# Patient Record
(demographics unavailable — no encounter records)

---

## 2024-10-16 NOTE — ASSESSMENT
[FreeTextEntry1] : Ms. Diez is a 88 year old female, with HTN, HLD, GERD, ?IBS, TIAs, solitary functioning kidney here for management of CKD  CKD: Likely in the setting of severe vascular disease as well as solitary kidney. She has stenosis of the right renal artery with renal atrophy. Serum creatinine recently has been ranging between 1.5-1.7 since 8/2022,. Recently noted to have a serum creatinine of 2.  Unclear if this was in the setting of decreased hydration  Repeat labs today with serum creatinine back down to 1.5 mg/dl which is her baseline K recently has been in the normal range. She can liberalize her diet  Acidosis persists: she admits to forgetting her PO bicarb TID and only takes BID. Will increase to 2 tabs BID.  U/A with no protein/blood. +WBC present but likely contaminated as epithelial cells present Avoid nephrotoxins. No NSAIDS  HTN: BP at goal Maintain current regimen.  Anemia: Hg slightly low but at goal for CKD DEE DEE only when Hg is less than 10  Mild Secondary Hyperparathyroidism: Vit D replete PTH trending down, Monitor  F/U In 4 months

## 2024-10-16 NOTE — REVIEW OF SYSTEMS
[Feeling Tired] : feeling tired [As Noted in HPI] : as noted in HPI [Joint Stiffness] : joint stiffness [Negative] : Heme/Lymph [Feeling Poorly] : feeling poorly [Recent Weight Gain (___ Lbs)] : no recent weight gain [Recent Weight Loss (___ Lbs)] : no recent weight loss [Abdominal Pain] : no abdominal pain [Vomiting] : no vomiting [Constipation] : no constipation [Diarrhea] : no diarrhea [Heartburn] : no heartburn [Pelvic Pain] : no pelvic pain [Dysmenorrhea] : no dysmenorrhea [Joint Swelling] : no joint swelling [FreeTextEntry7] :  IBS improved [FreeTextEntry8] : +NOCTURIA [FreeTextEntry9] : low back pain

## 2024-10-16 NOTE — HISTORY OF PRESENT ILLNESS
[FreeTextEntry1] : Ms. Diez is a  88 year old female, with HTN, HLD, GERD, ?IBS, TIAs, solitary functioning kidney here for management of CKD  Pt. was first made aware of having an atrophic kidney ~Dec 2019 when she was diagnosed with diverticulits . She is not aware of elevated Scr and mentions that she was advised to see a nephrologist due to solitary functioning kidney.  CT angiogram in 2020 revealed severe   diffuse atherosclerotic arterial calcification. 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 Severe stenosis at the origin of the superior mesenteric artery. Patent inferior mesenteric artery. 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  Fusiform dilatation of the ascending thoracic aorta, measuring 3.4 cm. She underwent graft placement of the right common iliac artery as well as the right superior mesenteric artery for mesenteric arterial stenosis. She  states that she has a poor appetite and has lost 90 lbs weight over past 3 years, and lost ~10 lbs in the last 6 months.    10/16: Melanoma resected 2 weeks ago It was an out patient procedure. Caught early ., LN negative Scheduled for PET ( pending insurance coverage)  Has Left back pain in the morning BP at home has been 130-140 range recent BUN/ creatinine : 35/2 mg/dl which is higher than her baseline

## 2024-10-16 NOTE — PHYSICAL EXAM
[General Appearance - Alert] : alert [General Appearance - In No Acute Distress] : in no acute distress [General Appearance - Well Nourished] : well nourished [Sclera] : the sclera and conjunctiva were normal [Examination Of The Oral Cavity] : the lips and gums were normal [Oropharynx] : the oropharynx was normal [Jugular Venous Distention Increased] : there was no jugular-venous distention [Exaggerated Use Of Accessory Muscles For Inspiration] : no accessory muscle use [Auscultation Breath Sounds / Voice Sounds] : lungs were clear to auscultation bilaterally [Heart Sounds] : normal S1 and S2 [Murmurs] : no murmurs [Heart Sounds Pericardial Friction Rub] : no pericardial rub [Edema] : there was no peripheral edema [Abdomen Soft] : soft [No CVA Tenderness] : no ~M costovertebral angle tenderness [Abnormal Walk] : normal gait [Involuntary Movements] : no involuntary movements were seen [] : no rash [No Focal Deficits] : no focal deficits [Affect] : the affect was normal [Oriented To Time, Place, And Person] : oriented to person, place, and time [Mood] : the mood was normal [FreeTextEntry1] : +pallor

## 2025-02-07 NOTE — PHYSICAL EXAM
[Normal] : Deep tendon reflexes were 2+ and symmetric, the sensory exam was normal to light touch and pinprick and no focal deficits [de-identified] : pain with lumbar flexion and extension [Straight-Leg Raise Test - Left] : straight leg raise of the left leg was negative [Straight-Leg Raise Test - Right] : straight leg raise  of the right leg was negative [] : positive on the right [Able to toe walk] : the patient was able to toe walk [Able to heel walk] : the patient was able to heel walk

## 2025-02-07 NOTE — ASSESSMENT
[FreeTextEntry1] : 88 year old female with low back pain and lumbar spinal stenosis.  Given the nature of the patient's complaints and lack of significant improvement following more conservative measures, we did discuss lumbar epidural steroid injection.  Risks, benefits, and expectations of the procedure were reviewed including but not limited to bleeding, infection, and nerve damage.  The patient was provided with an educational pamphlet outlining the details of the procedure so that he/she may have the ability to review the information prior to proceeding.  The patient is on Plavix so we have asked her to get a clearnace from her neurologist, Dr. Pruitt, to stop the medication for 7 days prior to the procedure.  She will follow up with me thereafter to schedule.

## 2025-02-07 NOTE — HISTORY OF PRESENT ILLNESS
[Back] : back [___ yrs] : [unfilled] year(s) ago [10] : a maximum pain level of 10/10 [Sharp] : sharp [Dull] : dull [Aching] : aching [Standing] : standing [Walking] : walking [Laying] : laying [Sitting] : sitting [Gait Dysfunction] : gait dysfunction [PT] : PT [Medications] : medications [FreeTextEntry5] : history of bladder incontinence [FreeTextEntry6] : Gabapentin, Tramadol, Lidocaine patches, Tylenol

## 2025-02-07 NOTE — DATA REVIEWED
[MRI] : MRI [FreeTextEntry1] : EXAM: [id number] - MR SPINE LUMBAR - ORDERED BY: RADHA WOOD   PROCEDURE DATE: 07/17/2024    INTERPRETATION: CLINICAL INFORMATION: Back pain  ADDITIONAL CLINICAL INFORMATION: Spondylolisthesis M43.16  TECHNIQUE: Multiplanar, multisequence MRI was performed of the lumbar spine. IV Contrast: NONE  PRIOR STUDIES: No priors available for comparison.  FINDINGS:  LOCALIZER: No additional findings. BONES: There is no fracture or bone marrow edema. ALIGNMENT: There is moderate levocurvature of the lumbar spine. Grade 1 anterolisthesis at L4-L5. SACROILIAC JOINTS/SACRUM: There is no sacral fracture. The SI joints are partially visualized but are intact. CONUS AND CAUDA EQUINA: The distal cord and conus are normal in signal. Conus terminates at L1. VISUALIZED INTRAPELVIC/INTRA-ABDOMINAL SOFT TISSUES: Moderate right renal cortical atrophy. PARASPINAL SOFT TISSUES: Normal.   INDIVIDUAL LEVELS:  LOWER THORACIC SPINE: No spinal canal or neuroforaminal stenosis.  L1-L2: Broad-based posterior disc bulge and mild bilateral facet arthropathy result in mild right neural foraminal narrowing but no significant left neural foraminal narrowing or central canal narrowing. L2-L3: Broad-based posterior disc bulge and moderate bilateral facet arthropathy result in mild bilateral neural foraminal narrowing but no significant central canal narrowing. L3-L4: Broad-based posterior disc bulge with superimposed right intraforaminal protrusion in conjunction with moderate bilateral facet arthropathy results in mild to moderate right neural foraminal narrowing but no significant left neural foraminal narrowing or central canal narrowing. L4-L5: Grade 1 anterolisthesis. Broad-based posterior disc bulge and moderate bilateral facet arthropathy results in mild to moderate central canal narrowing but no significant right or left neural foraminal narrowing. There is bilateral lateral recess stenosis. L5-S1: Broad-based posterior disc bulge and moderate bilateral facet arthropathy result in mild bilateral neural foraminal narrowing but no significant central canal narrowing.   IMPRESSION:  Multilevel discogenic degenerative disease and facet arthropathy of the lumbar spine, most pronounced at L4-L5 where there is mild to moderate central canal narrowing. Additional varying degrees of central canal and neural foraminal narrowing, as above.  --- End of Report ---       MIKI VASQUEZ MBA-EDGARDO FOSTER; Attending Radiologist This document has been electronically signed. Jul 24 2024 10:37AM

## 2025-02-21 NOTE — PHYSICAL EXAM
[General Appearance - Alert] : alert [General Appearance - In No Acute Distress] : in no acute distress [Sclera] : the sclera and conjunctiva were normal [Examination Of The Oral Cavity] : the lips and gums were normal [Oropharynx] : the oropharynx was normal [Jugular Venous Distention Increased] : there was no jugular-venous distention [Exaggerated Use Of Accessory Muscles For Inspiration] : no accessory muscle use [Auscultation Breath Sounds / Voice Sounds] : lungs were clear to auscultation bilaterally [Heart Sounds] : normal S1 and S2 [Murmurs] : no murmurs [Heart Sounds Pericardial Friction Rub] : no pericardial rub [Abdomen Soft] : soft [No CVA Tenderness] : no ~M costovertebral angle tenderness [Abnormal Walk] : normal gait [Involuntary Movements] : no involuntary movements were seen [] : no rash [No Focal Deficits] : no focal deficits [Oriented To Time, Place, And Person] : oriented to person, place, and time [Affect] : the affect was normal [Mood] : the mood was normal [FreeTextEntry1] : trace edema

## 2025-02-21 NOTE — ASSESSMENT
[FreeTextEntry1] : Ms. Diez is a 88 year old female, with HTN, HLD, GERD, ?IBS, TIAs, solitary functioning kidney here for management of CKD  CKD: Likely in the setting of severe vascular disease as well as solitary kidney. She has stenosis of the right renal artery with renal atrophy. Serum creatinine recently has been ranging between 1.5-1.7 since 8/2022,.  Repeat labs today noted. Creatinine at baseline K back up again. Low K diet Acidosis persists. Maintain on PO bicarb. Asked if she can increase to 3 tabs bid U/A with no protein/blood.   Avoid nephrotoxins. No NSAIDS  HTN: BP elevated, also with trace edema Admits to eating high salt food Reinforced low-salt diet.  Also asked her to buy a home BP monitor and keep a recording for the next couple of weeks and email me the final results Maintain current regimen.  Anemia: Hg stable Currently with no need for DEE DEE  Mild Secondary Hyperparathyroidism: Vit D replete PTH overall stable.   F/U In 4 months  Left detailed vm for daughter Niecy

## 2025-02-21 NOTE — REVIEW OF SYSTEMS
[Feeling Poorly] : feeling poorly [Feeling Tired] : feeling tired [As Noted in HPI] : as noted in HPI [Joint Stiffness] : joint stiffness [Negative] : Heme/Lymph [SOB on Exertion] : shortness of breath during exertion [Recent Weight Gain (___ Lbs)] : no recent weight gain [Recent Weight Loss (___ Lbs)] : no recent weight loss [Abdominal Pain] : no abdominal pain [Vomiting] : no vomiting [Constipation] : no constipation [Diarrhea] : no diarrhea [Heartburn] : no heartburn [Pelvic Pain] : no pelvic pain [Dysmenorrhea] : no dysmenorrhea [Joint Swelling] : no joint swelling [FreeTextEntry7] :  IBS improved [FreeTextEntry8] : +NOCTURIA [FreeTextEntry9] : low back pain

## 2025-02-21 NOTE — HISTORY OF PRESENT ILLNESS
[FreeTextEntry1] : Ms. Diez is a  88 year old female, with HTN, HLD, GERD, ?IBS, TIAs, solitary functioning kidney here for management of CKD  Pt. was first made aware of having an atrophic kidney ~Dec 2019 when she was diagnosed with diverticulits . She is not aware of elevated Scr and mentions that she was advised to see a nephrologist due to solitary functioning kidney.  CT angiogram in 2020 revealed severe   diffuse atherosclerotic arterial calcification. 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 Severe stenosis at the origin of the superior mesenteric artery. Patent inferior mesenteric artery. 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  Fusiform dilatation of the ascending thoracic aorta, measuring 3.4 cm. She underwent graft placement of the right common iliac artery as well as the right superior mesenteric artery for mesenteric arterial stenosis. She  states that she has a poor appetite and has lost 90 lbs weight over past 3 years, and lost ~10 lbs in the last 6 months.   10/16: Melanoma resected 2 weeks ago It was an out patient procedure. Caught early ., LN negative Scheduled for PET ( pending insurance coverage)  Has Left back pain in the morning BP at home has been 130-140 range recent BUN/ creatinine : 35/2 mg/dl which is higher than her baseline  Feb 2025: She comes for a follow-up visit +Nodule lung: Plan is to monitor  Thyroid sonogram  /PET scan  done: results pending  Needs epidural for her back pain so currently her Plavix is on hold, Has not been measuring BP at home .  Admits to eating a high salt diet since the Super Bowl

## 2025-02-27 NOTE — PROCEDURE
[de-identified] : [facility] [address], [place], [address] - [phone]  PATIENT: FARSHAD ROSE  MEDICAL RECORD #:  DATE OF OPERATION: 02/27/2025  PREOPERATIVE DIAGNOSIS:  LUMBAR RADICULAR PAIN POSTOPERATIVE DIAGNOSIS:  LUMBAR RADICULAR PAIN PROCEDURE: LUMBAR EPIDURAL STEROID INJECTION UNDER X-RAY GUIDANCE SURGEON:  JEAN CLAUDE CA M.D. ANESTHEISA:  LOCAL EBL:  MINIMAL  INDICATIONS:  The patient returns to Pain Management with persistent lower back pain with radiation down both legs.  The pain has remained quite significant and interferes with the patients ability to perform ADLs despite the implementation of other conservative measures.  I discussed with the patient at length the risks, benefits, and expectations of the aforementioned procedure.  All of the patients questions were answered.  The patient signed informed consent and agreed to proceed.  PROCEDURE:  The patient was transported to the operating room, placed in the prone position and monitored non-invasively with stable vital signs and no complaints.  The patients back was prepped three times with betadine and draped in meticulous sterile fashion.  The L4-L5 interspace was identified fluoroscopically and the skin overlying this level was infiltrated with 5cc of 1% preservative-free lidocaine using a 25 gauge one inch needle.  The epidural space was approached and entered at this level with a 20 gauge Tuohy needle by loss of resistance technique.  Following loss of resistance to air, aspiration was negative for CSF or heme.  Depo-Medrol 80mg was then injected with 1cc of preservative-free 1% lidocaine.  The needle tract was flushed with 2cc of 1% lidocaine and the needle was removed.  A sterile bandage was applied.  The patient tolerated the procedure well without complaint or complication.  The patient was observed in stable condition after the procedure for approximately 30 minutes before being discharged to home in the company of an adult.  The patient was provided with full written instructions.  The patient will be seen for follow-up in my office in 1 week but certainly sooner with any questions or concerns.    Jean Claude Ca M.D.

## 2025-03-19 NOTE — HISTORY OF PRESENT ILLNESS
[FreeTextEntry1] : Patient returns after the first LESI a few weeks ago.  The patient reports 10% improvement in the symptoms.  The patient returns for a follow up visit today.

## 2025-03-19 NOTE — ASSESSMENT
[FreeTextEntry1] : 88 year old female with low back and lumbar radicular pain now minimally improved following her first LESI.  She is agreeable to a repeat injection.  Risks, benefits, and expectations of the procedure were reviewed including but not limited to bleeding, infection, and nerve damage.  The patient was provided with an educational pamphlet outlining the details of the procedure so that he/she may have the ability to review the information prior to proceeding.  The patient has agreed to proceed and will follow up with me for the procedure.

## 2025-03-21 NOTE — ADDENDUM
[FreeTextEntry1] :  Documented by Timmy Ronquillo acting as scribe for Dr. Trammell on 03/21/2025. All Medical record entries made by the Scribe were at my, Dr. Trammell, direction and personally dictated by me on 03/21/2025 . I have reviewed the chart and agree that the record accurately reflects my personal performance of the history, physical exam, assessment and plan. I have also personally directed, reviewed, and agreed with the discharge instructions.

## 2025-03-21 NOTE — ASSESSMENT
[FreeTextEntry1] :  Reviewed and reconciled medications, allergies, PMHx, PSHx, SocHx, FMHx.  Pt. with h/o dizziness, bilateral SNHL - wears hearing aids, tinnitus, dizziness, reflux, dysphagia, vocal cord paresis, cough, chronic rhinitis, and constantly spitting up phlegm presents today stating that she gets a lot of phlegm in her throat and she feels like she cannot breathe. She states that she gets a PND. She states that she used Atrovent nasal spray in the past with significant improvement. She states that her nose runs frequently. She states that she is taking blood thinners. She states that she has had a COVID vaccine.  Physical exam: -NOSE score 65 -TNSS 8 -right ear canal: cerumen impaction removed via curettage -deviated septum -inflamed turbinates -nasal valve collapse -positive risa maneuver -clear nasal discharge  ENT Procedure: Flexible Nasal Endoscopy with a Look at the Larynx -deviated septum left -no polyps or growths bilaterally -enlarged middle turbinate bilaterally -mild edema postcricoid  Plan: -Continue Atrovent - 1 or 2 sprays bilaterally up to QID, spray laterally -Discussed bilateral Rhinaer procedure for posterior nasal nerve ablation of the inferior and middle turbinate. R/b/a were discussed. Risks include but are not limited to bleeding, infection, crusting, scarring, persistence of symptoms, need for future procedure. All questions were answered. -FU after procedure

## 2025-03-21 NOTE — PHYSICAL EXAM
[] : septum deviated bilaterally [Normal] : mucosa is normal [Midline] : trachea located in midline position [Hearing Loss Right Only] : normal [Hearing Loss Left Only] : normal [FreeTextEntry8] : cerumen impaction removed via curettage [de-identified] : nasal valve collapse. positive risa maneuver [de-identified] : inflamed turbinates [de-identified] : clear nasal discharge

## 2025-03-21 NOTE — HISTORY OF PRESENT ILLNESS
[de-identified] : Pt. with h/o dizziness, bilateral SNHL - wears hearing aids, tinnitus, dizziness, reflux, dysphagia, vocal cord paresis, cough, chronic rhinitis, and constantly spitting up phlegm presents today stating that she gets a lot of phlegm in her throat and she feels like she cannot breathe. She states that she gets a PND. She states that she used Atrovent nasal spray in the past with significant improvement. She states that her nose runs frequently. She states that she is taking blood thinners. She states that she has had a COVID vaccine.
Discharged

## 2025-03-21 NOTE — CONSULT LETTER
[Dear  ___] : Dear  [unfilled], [Courtesy Letter:] : I had the pleasure of seeing your patient, [unfilled], in my office today. [Please see my note below.] : Please see my note below. [Consult Closing:] : Thank you very much for allowing me to participate in the care of this patient.  If you have any questions, please do not hesitate to contact me. [Sincerely,] : Sincerely, [FreeTextEntry3] :  Bon Trammell MD FACS

## 2025-03-21 NOTE — PROCEDURE
[Recalcitrant Symptoms] : recalcitrant symptoms  [Flexible Endoscope] : examined with the flexible endoscope [de-identified] : Dr. Trammell [de-identified] : Chronic rhinitis, Cough [FreeTextEntry6] :  Procedure: Flexible Nasal Endoscopy with a Look at the Larynx: Risks, benefits, and alternatives of flexible endoscopy were explained to the patient. The patient gave oral consent to proceed. The flexible scope was inserted into the right nasal cavity. Endoscopy of the inferior and middle meatus was performed. No polyp, mass, or lesion was appreciated. Olfactory cleft was clear. Spheno-ethmoid recess clear. Nasopharynx was clear. Turbinates were without mass, but enlarged middle turbinate bilaterally. There was a deviated septum left. Oropharyngeal walls were symmetric and mobile without lesion, mass, or edema. Hypopharynx was also without lesion or edema. Larynx was mobile without lesions. Supraglottic structures were free of mass and asymmetry, but mild edema postcricoid. True vocal folds were white without mass or lesion. Base of tongue was within normal limits. -deviated septum left -no polyps or growths bilaterally -enlarged middle turbinate bilaterally -mild edema postcricoid

## 2025-04-01 NOTE — HISTORY OF PRESENT ILLNESS
[de-identified] : Pt. with h/o dizziness, bilateral SNHL - wears hearing aids, tinnitus, reflux, dysphagia, vocal cord paresis, cough, chronic rhinitis, GERD, and laryngeal edema presents today for an in-office procedure: bilateral Rhinaer procedure for posterior nasal nerve ablation of the inferior and middle turbinate.

## 2025-04-01 NOTE — HISTORY OF PRESENT ILLNESS
[de-identified] : Pt. with h/o dizziness, bilateral SNHL - wears hearing aids, tinnitus, reflux, dysphagia, vocal cord paresis, cough, chronic rhinitis, GERD, and laryngeal edema presents today for an in-office procedure: bilateral Rhinaer procedure for posterior nasal nerve ablation of the inferior and middle turbinate.

## 2025-04-01 NOTE — ADDENDUM
[FreeTextEntry1] :  Documented by Timmy Ronquillo acting as scribe for Dr. Trammell on 04/01/2025. All Medical record entries made by the Scribe were at my, Dr. Trammell, direction and personally dictated by me on 04/01/2025 . I have reviewed the chart and agree that the record accurately reflects my personal performance of the history, physical exam, assessment and plan. I have also personally directed, reviewed, and agreed with the discharge instructions.

## 2025-04-01 NOTE — ASSESSMENT
[FreeTextEntry1] :  Reviewed and reconciled medications, allergies, PMHx, PSHx, SocHx, FMHx.  Pt. with h/o dizziness, bilateral SNHL - wears hearing aids, tinnitus, reflux, dysphagia, vocal cord paresis, cough, chronic rhinitis, GERD, and laryngeal edema presents today for an in-office procedure: bilateral Rhinaer procedure for posterior nasal nerve ablation of the inferior and middle turbinate.  ENT Procedure: bilateral Rhinaer procedure for posterior nasal nerve ablation of the inferior and middle turbinate  Plan: -Start saline gel spray (AYR No Drip) - avoid swallowing -Post-op instructions provided -FU in 3 weeks

## 2025-04-01 NOTE — PROCEDURE
[FreeTextEntry1] : bilateral Rhinaer procedure for posterior nasal nerve ablation of the inferior and middle turbinate [FreeTextEntry2] : Chronic rhinitis [FreeTextEntry3] : After informed consent was obtained, pledgets impregnated with 2% topical lidocaine and oxymetazoline were applied to bilateral nasal cavities. Then a total of 7cc of 2% lidocaine was injected bilaterally. Once sufficiently anesthetized, the temperature-controlled radiofrequency stylus was introduced to ablate the turbinates with low energy, temperature controlled bilaterally. Right middle turbinate hit with stylus 8 times, each time for 12 seconds. Left middle turbinate hit with stylus 8 times, each time for 12 seconds. Then the right inferior turbinate was hit with the low energy, temperature-controlled stylus 3 times, the left inferior turbinate hit with the stylus 4 times. The patient tolerated the procedure without difficulty, and no complications were encountered. Post procedure instructions provided.

## 2025-04-15 NOTE — HISTORY OF PRESENT ILLNESS
[TextBox_4] : 88 y.o female PMH Asthma, HTN, GERD, Allergic rhinitis, HLD, Diverticulitis, Mesenteric ischemia IBS here for pulmonary follow up for asthma.   patient recently had episodes of feeling that she cannot breathe. non exertional felt as if she could not however would be able to breathe once she tried. she denies chest tightness, shortness of breath, fevers, chills, mucus, choking sensation. daughter notes that her legs were swollen and that she uses a slight angle with pillows while sleeping.  compliant with her breo   Social: + hx social smoking, quit 1974 (1 pack a weekend x 37 years) Smoking Status: < 20 pack-years Year quit: 1974.   Smoking Status: former   Year quit: 1974 4/14/25: Patient presents to the office for a follow-up visit for her asthma, doing well from a pulmonary standpoint. Patient endorses compliance with Breo Ellipta daily. Denies any hospitalizations, urgent care visits or prednisone courses since last visit. Patient states she recently had melanoma removed and underwent a PET scan for further evaluation and was found to have a nodule on her thyroid which she will have surgery on shortly. Patient states she was suffering from difficulty breathing at night which she originally attributed to her lungs, was found to be related to upper airway and she underwent sinus surgery two weeks ago for chronic rhinitis. Endorses shortness of breath with activity, denies any wheeze or other associated symptoms. Patient is due to have an epidural in a few weeks for her back pain and decreased mobility.

## 2025-04-15 NOTE — ASSESSMENT
[FreeTextEntry1] : 88-year-old female who presents for a follow-up visit for her Asthma. Patient states she is doing well from a pulmonary standpoint, continued on Breo Ellipta daily   Asthma -Well controlled on Breo Ellipta daily, has not required albuterol at all in the past two weeks Continue with Breo Ellipta daily, rinse after use -Will send Pro BNP in office today to evaluate for any evidence of CHF    RTC in June I, Jose Billy NP, am scribing for and in the presence of Dr. Christiano Martinez, the following sections HISTORY OF PRESENT ILLNESS, PAST MEDICAL/FAMILY/SOCIAL HISTORY; REVIEW OF SYSTEMS; VITAL SIGNS; PHYSICAL EXAM; DISPOSITION.  Labs in office today

## 2025-04-21 NOTE — PROCEDURE
[Recalcitrant Symptoms] : recalcitrant symptoms  [Flexible Endoscope] : examined with the flexible endoscope [de-identified] : Dr. Trammell [de-identified] : Nasal congestion with rhinorrhea, Chronic rhinitis [FreeTextEntry6] :  Procedure: Flexible Nasal Endoscopy: Risks, benefits, and alternatives of flexible endoscopy were explained to the patient. The patient gave oral consent to proceed. The flexible scope was inserted into the right nasal cavity. Endoscopy of the inferior and middle meatus was performed. No polyp, mass, or lesion was appreciated. Olfactory cleft was clear. Spheno-ethmoid recess is clear. Nasopharynx was clear. Turbinates were without mass, but scabbing on the inferior turbinate bilaterally and scabbing on the anterior lateral aspect of the right middle turbinate. The procedure was repeated on the contralateral side with similar findings. -scabbing on the inferior turbinate bilaterally -scabbing on the anterior lateral aspect of the right middle turbinate

## 2025-04-21 NOTE — ADDENDUM
[FreeTextEntry1] :  Documented by Timmy Ronquillo acting as scribe for Dr. Trammell on 04/21/2025. All Medical record entries made by the Scribe were at my, Dr. Trammell, direction and personally dictated by me on 04/21/2025 . I have reviewed the chart and agree that the record accurately reflects my personal performance of the history, physical exam, assessment and plan. I have also personally directed, reviewed, and agreed with the discharge instructions.

## 2025-04-21 NOTE — ASSESSMENT
[FreeTextEntry1] : Reviewed and reconciled medications, allergies, PMHx, PSHx, SocHx, FMHx.  Pt. with h/o dizziness, bilateral SNHL - wears hearing aids, tinnitus, reflux, dysphagia, vocal cord paresis, cough, PND, nasal congestion with rhinorrhea, chronic rhinitis, GERD, laryngeal edema, and bilateral Rhinaer procedure for posterior nasal nerve ablation of the inferior and middle turbinate 4/1/25 presents today for a follow up. She states that she had constant nasal congested for about a week after the procedure, but this has improved. She states that she has been having trouble getting her hearing aid all the way in her left ear.  Physical exam: -left ear canal: cerumen removed via curettage -no visible oral PND  ENT Procedure: Flexible nasal endoscopy -scabbing on the inferior turbinate bilaterally -scabbing on the anterior lateral aspect of the right middle turbinate  Plan: -Continue saline gel spray (AYR No Drip) -FU in 4 months

## 2025-04-21 NOTE — HISTORY OF PRESENT ILLNESS
[de-identified] : Pt. with h/o dizziness, bilateral SNHL - wears hearing aids, tinnitus, reflux, dysphagia, vocal cord paresis, cough, PND, nasal congestion with rhinorrhea, chronic rhinitis, GERD, laryngeal edema, and bilateral Rhinaer procedure for posterior nasal nerve ablation of the inferior and middle turbinate 4/1/25 presents today for a follow up. She states that she had constant nasal congested for about a week after the procedure, but this has improved. She states that she has been having trouble getting her hearing aid all the way in her left ear.

## 2025-04-21 NOTE — PHYSICAL EXAM
[Midline] : trachea located in midline position [Normal] : no rashes [Hearing Loss Right Only] : normal [Hearing Loss Left Only] : normal [FreeTextEntry9] : cerumen removed via curettage [de-identified] : no visible oral PND [FreeTextEntry2] : sinuses nontender to percussion. sensations intact

## 2025-05-08 NOTE — PROCEDURE
[de-identified] : [facility] [address], [place], [address] - [phone]  PATIENT: FARSHAD ROSE  MEDICAL RECORD #:  DATE OF OPERATION: 05/08/2025  PREOPERATIVE DIAGNOSIS:  LUMBAR RADICULAR PAIN POSTOPERATIVE DIAGNOSIS:  LUMBAR RADICULAR PAIN PROCEDURE: LUMBAR EPIDURAL STEROID INJECTION UNDER X-RAY GUIDANCE SURGEON:  JEAN CLAUDE CA M.D. ANESTHEISA:  LOCAL EBL:  MINIMAL  INDICATIONS:  The patient returns to Pain Management with persistent lower back pain with radiation down both legs.  The pain has remained quite significant and interferes with the patients ability to perform ADLs despite the implementation of other conservative measures.  I discussed with the patient at length the risks, benefits, and expectations of the aforementioned procedure.  All of the patients questions were answered.  The patient signed informed consent and agreed to proceed.  PROCEDURE:  The patient was transported to the operating room, placed in the prone position and monitored non-invasively with stable vital signs and no complaints.  The patients back was prepped three times with betadine and draped in meticulous sterile fashion.  The L4-L5 interspace was identified fluoroscopically and the skin overlying this level was infiltrated with 5cc of 1% preservative-free lidocaine using a 25 gauge one inch needle.  The epidural space was approached and entered at this level with a 20 gauge Tuohy needle by loss of resistance technique.  Following loss of resistance to air, aspiration was negative for CSF or heme.  Depo-Medrol 80mg was then injected with 1cc of preservative-free 1% lidocaine.  The needle tract was flushed with 2cc of 1% lidocaine and the needle was removed.  A sterile bandage was applied.  The patient tolerated the procedure well without complaint or complication.  The patient was observed in stable condition after the procedure for approximately 30 minutes before being discharged to home in the company of an adult.  The patient was provided with full written instructions.  The patient will be seen for follow-up in my office in 1 week but certainly sooner with any questions or concerns.    Jean Claude Ca M.D.

## 2025-05-28 NOTE — DATA REVIEWED
[MRI] : MRI [FreeTextEntry1] : MRI from July of 2024 showed moderate facet arthropathy, worst at L4-L5.

## 2025-05-28 NOTE — ASSESSMENT
[FreeTextEntry1] : 88 year old female with low back pain and lumbar radicular pain and lumbar spondylosis.  Given the nature of the patient's complaints and lack of significant improvement following more conservative measures, we did discuss medial branch blocks.  Risks, benefits, and expectations of the procedure were reviewed including but not limited to bleeding, infection, and nerve damage.  The patient was provided with an educational pamphlet outlining the details of the procedure so that he/she may have the ability to review the information prior to proceeding.  The patient has agreed to proceed and will follow up with me for the procedure.

## 2025-05-28 NOTE — HISTORY OF PRESENT ILLNESS
[Home] : at home, [unfilled] , at the time of the visit. [Medical Office: ([facility])___] : at the medical office located in  [Telehealth (audio & video)] : This visit was provided via telehealth using real-time 2-way audio visual technology. [Verbal consent obtained from patient] : the patient, [unfilled] [FreeTextEntry1] :   Patient returns after the second LESI a few weeks ago.  The patient reports no improvement in the symptoms.  The patient returns for a follow up visit today.  She continues to have significant lower back pain without radiation.

## 2025-06-02 NOTE — PHYSICAL EXAM
[Chamberlain Test Lateralizes To Right] : tone lateralization to the right [] : septum deviated to the left [Midline] : trachea located in midline position [Removed] : palatine tonsils previously removed [Normal] : no rashes [Hearing Loss Right Only] : normal [Hearing Loss Left Only] : normal [FreeTextEntry8] : cerumen removed via curettage [FreeTextEntry9] : cerumen removed via curettage [de-identified] : inflamed turbinates on the right [FreeTextEntry2] : sinuses nontender to percussion. sensations intact

## 2025-06-02 NOTE — ASSESSMENT
[FreeTextEntry1] : Reviewed and reconciled medications, allergies, PMHx, PSHx, SocHx, FMHx.  Pt. with h/o dizziness, bilateral SNHL - wears hearing aids, tinnitus, reflux, dysphagia, vocal cord paresis, cough, PND, nasal congestion with rhinorrhea, chronic rhinitis, GERD, laryngeal edema, and bilateral Rhinaer procedure for posterior nasal nerve ablation of the inferior and middle turbinate 4/1/25 presents today with daughter stating that she thinks that she needs a hearing test. She states that she has been worsening in her hearing. She states that she has been coughing up phlegm again recently, especially at night.  Physical exam: -right ear canal: cerumen removed via curettage -left ear canal: cerumen removed via curettage -tuning forks lateralizes to the right ear -deviated septum left -inflamed turbinates on the right -tonsils removed  Audio 6/2/25: -bilateral sloping SNHL L>R -essentially stable from previous audio  Plan: Audio - results interpreted by Dr. Trammell and reviewed with the patient. -Continue amplification -Start Atrovent - 1 or 2 sprays bilaterally up to QID, spray laterally -FU in 6 months

## 2025-06-02 NOTE — HISTORY OF PRESENT ILLNESS
[de-identified] : Pt. with h/o dizziness, bilateral SNHL - wears hearing aids, tinnitus, reflux, dysphagia, vocal cord paresis, cough, PND, nasal congestion with rhinorrhea, chronic rhinitis, GERD, laryngeal edema, and bilateral Rhinaer procedure for posterior nasal nerve ablation of the inferior and middle turbinate 4/1/25 presents today with daughter stating that she thinks that she needs a hearing test. She states that she has been worsening in her hearing. She states that she has been coughing up phlegm again recently, especially at night.

## 2025-06-02 NOTE — ADDENDUM
[FreeTextEntry1] :  Documented by Timmy Ronquillo acting as scribe for Dr. Trammell on 06/02/2025. All Medical record entries made by the Scribe were at my, Dr. Trammell, direction and personally dictated by me on 06/02/2025 . I have reviewed the chart and agree that the record accurately reflects my personal performance of the history, physical exam, assessment and plan. I have also personally directed, reviewed, and agreed with the discharge instructions.

## 2025-06-02 NOTE — DATA REVIEWED
[de-identified] : Audio 6/2/25: -bilateral sloping SNHL L>R -essentially stable from previous audio

## 2025-07-07 NOTE — PROCEDURE
[de-identified] : [facility] [address] [place] [address], [id number] - [phone]  PATIENT: FARSHAD ROSE  MEDICAL RECORD #:   DATE OF OPERATION: 07/07/2025  PREOPERATIVE DIAGNOSIS:  LUMBAR FACET ARTHROPATHY POSTOPERATIVE DIAGNOSIS:  LUMBAR FACET ARTHROPATHY PROCEDURE: LUMBAR MEDIAL BRANCH BLOCK UNDER X-RAY GUIDANCE SURGEON:  JEAN CLAUDE CA M.D. ANESTHEISA:  LOCAL EBL:  MINIMAL  INDICATIONS:  The patient returns to Pain Management with persistent back pain with radiation to both sides.  The pain has remained quite significant and interferes with the patients ability to perform ADLs despite the implementation of other conservative measures.  I discussed with the patient at length the risks, benefits, and expectations of the aforementioned procedure.  All of the patients questions were answered.  The patient signed informed consent and agreed to proceed.  PROCEDURE:  The patient was transported to the operating room, placed in the prone position and monitored non-invasively with stable vital signs and no complaints.  The patients back was prepped three times with betadine and draped in meticulous sterile fashion.  The bilateral L4-L5 facet joints were identified fluoroscopically and the skin overlying each level was infiltrated with 5cc of 1% preservative-free lidocaine using a 25 gauge one inch needle.  The junction between the inferior articulating process of L3 and the L4 transverse process and the junction between the inferior articulating process of L4 and the L5 transverse process was approached with a 22 gauge 3  inch spinal needle.  Aspiration was negative for CSF or heme.  2 cc of preservative-free 1% lidocaine was then injected.  The needle tracts were flushed with 2cc of 1% lidocaine and the needles were removed.  The procedure was then repeated on the left side.  A sterile bandage was applied to each site.  The patient tolerated the procedure well without complaint or complication.  The patient was observed in stable condition after the procedure for approximately 30 minutes before being discharged to home in the company of an adult.  The patient was provided with full written instructions.  The patient will be seen for follow-up in my office in 1 week but certainly sooner with any questions or concerns.    Jean Claude Ca M.D.

## 2025-07-07 NOTE — REVIEW OF SYSTEMS
[Feeling Poorly] : feeling poorly [Feeling Tired] : feeling tired [SOB on Exertion] : shortness of breath during exertion [As Noted in HPI] : as noted in HPI [Joint Stiffness] : joint stiffness [Negative] : Heme/Lymph [Recent Weight Gain (___ Lbs)] : no recent weight gain [Recent Weight Loss (___ Lbs)] : no recent weight loss [Abdominal Pain] : no abdominal pain [Vomiting] : no vomiting [Constipation] : no constipation [Diarrhea] : no diarrhea [Heartburn] : no heartburn [Pelvic Pain] : no pelvic pain [Dysmenorrhea] : no dysmenorrhea [Joint Swelling] : no joint swelling [FreeTextEntry7] :  IBS improved [FreeTextEntry8] : +NOCTURIA [FreeTextEntry9] : low back pain

## 2025-07-15 NOTE — HISTORY OF PRESENT ILLNESS
[FreeTextEntry1] : Patient returns after her first MBB.  Unfortunately, she does not feel any relief from this procedure.  She has also not gained any benefit from epidural injections.  She is here to discuss her progress and treatment options.

## 2025-07-15 NOTE — ASSESSMENT
[FreeTextEntry1] : 88 year old female with low back and lumbar radicular pain and spondylosis not improved following her MBB.  We did have an extensive discussion regarding her treatment options including medication, PT, acupuncture, medical marijuana, and surgery.  She will decide on which direction she would like to proceed and will follow up with me thereafter as necessary.